# Patient Record
Sex: FEMALE | NOT HISPANIC OR LATINO | URBAN - NONMETROPOLITAN AREA
[De-identification: names, ages, dates, MRNs, and addresses within clinical notes are randomized per-mention and may not be internally consistent; named-entity substitution may affect disease eponyms.]

---

## 2017-04-27 ENCOUNTER — APPOINTMENT (RX ONLY)
Dept: URBAN - NONMETROPOLITAN AREA CLINIC 29 | Facility: CLINIC | Age: 27
Setting detail: DERMATOLOGY
End: 2017-04-27

## 2017-04-27 VITALS — SYSTOLIC BLOOD PRESSURE: 110 MMHG | WEIGHT: 135 LBS | DIASTOLIC BLOOD PRESSURE: 70 MMHG | HEIGHT: 65 IN

## 2017-04-27 DIAGNOSIS — L738 OTHER SPECIFIED DISEASES OF HAIR AND HAIR FOLLICLES: ICD-10-CM

## 2017-04-27 DIAGNOSIS — L73.9 FOLLICULAR DISORDER, UNSPECIFIED: ICD-10-CM

## 2017-04-27 DIAGNOSIS — L57.0 ACTINIC KERATOSIS: ICD-10-CM

## 2017-04-27 DIAGNOSIS — L663 OTHER SPECIFIED DISEASES OF HAIR AND HAIR FOLLICLES: ICD-10-CM

## 2017-04-27 PROBLEM — L02.425 FURUNCLE OF RIGHT LOWER LIMB: Status: ACTIVE | Noted: 2017-04-27

## 2017-04-27 PROCEDURE — ? PATIENT SPECIFIC COUNSELING

## 2017-04-27 PROCEDURE — ? LIQUID NITROGEN

## 2017-04-27 PROCEDURE — 17000 DESTRUCT PREMALG LESION: CPT

## 2017-04-27 PROCEDURE — ? INTRALESIONAL KENALOG

## 2017-04-27 PROCEDURE — 11900 INJECT SKIN LESIONS </W 7: CPT | Mod: 59

## 2017-04-27 ASSESSMENT — LOCATION SIMPLE DESCRIPTION DERM
LOCATION SIMPLE: RIGHT THIGH
LOCATION SIMPLE: LEFT CHEEK
LOCATION SIMPLE: LEFT CHEEK
LOCATION SIMPLE: RIGHT THIGH

## 2017-04-27 ASSESSMENT — LOCATION DETAILED DESCRIPTION DERM
LOCATION DETAILED: RIGHT ANTERIOR MEDIAL PROXIMAL THIGH
LOCATION DETAILED: LEFT INFERIOR MEDIAL MALAR CHEEK
LOCATION DETAILED: RIGHT ANTERIOR MEDIAL PROXIMAL THIGH
LOCATION DETAILED: LEFT INFERIOR MEDIAL MALAR CHEEK

## 2017-04-27 ASSESSMENT — LOCATION ZONE DERM
LOCATION ZONE: LEG
LOCATION ZONE: FACE
LOCATION ZONE: FACE
LOCATION ZONE: LEG

## 2017-04-27 NOTE — PROCEDURE: LIQUID NITROGEN
Duration Of Freeze Thaw-Cycle (Seconds): 1
Post-Care Instructions: I reviewed with the patient in detail post-care instructions. Patient is to wear sunprotection, and avoid picking at any of the treated lesions. Pt may apply Vaseline to crusted or scabbing areas.
Render Post-Care Instructions In Note?: no
Consent: The patient's consent was obtained including but not limited to risks of crusting, scabbing, blistering, scarring, darker or lighter pigmentary change, recurrence, incomplete removal and infection.
Detail Level: Detailed
Number Of Freeze-Thaw Cycles: 1 freeze-thaw cycle

## 2017-04-27 NOTE — PROCEDURE: MIPS QUALITY
Quality 130: Documentation Of Current Medications In The Medical Record: Current Medications Documented
Quality 431: Preventive Care And Screening: Unhealthy Alcohol Use - Screening: Patient screened for unhealthy alcohol use using a single question and scores less than 2 times per year
Detail Level: Detailed
Quality 128: Preventive Care And Screening: Body Mass Index (Bmi) Screening And Follow-Up Plan: BMI is documented within normal parameters and no follow-up plan is required.
Quality 226: Preventive Care And Screening: Tobacco Use: Screening And Cessation Intervention: Patient screened for tobacco and never smoked
Quality 110: Preventive Care And Screening: Influenza Immunization: Influenza Immunization Administered during Influenza season

## 2017-04-27 NOTE — PROCEDURE: PATIENT SPECIFIC COUNSELING
Detail Level: Detailed
Patient is exposed to elements with history of frost bite.  Discussed preventive measures.

## 2017-04-27 NOTE — PROCEDURE: INTRALESIONAL KENALOG
Detail Level: Detailed
Lot # For Kenalog (Optional): [id number]
X Size Of Lesion In Cm (Optional): 0
Consent: The risks of atrophy were reviewed with the patient.
Expiration Date For Kenalog (Optional): Aug 2018
Concentration Of Kenalog Solution Injected (Mg/Ml): 5.0
Kenalog Preparation: Kenalog
Total Volume (Ccs): 0.4
Medical Necessity Clause: This procedure was medically necessary because the lesions that were treated were:
Administered By (Optional): Mitra Diaz MD
Include Z78.9 (Other Specified Conditions Influencing Health Status) As An Associated Diagnosis?: No

## 2017-07-11 ENCOUNTER — APPOINTMENT (RX ONLY)
Dept: URBAN - NONMETROPOLITAN AREA CLINIC 29 | Facility: CLINIC | Age: 27
Setting detail: DERMATOLOGY
End: 2017-07-11

## 2017-07-11 VITALS — WEIGHT: 135 LBS | SYSTOLIC BLOOD PRESSURE: 118 MMHG | HEIGHT: 65 IN | DIASTOLIC BLOOD PRESSURE: 80 MMHG

## 2017-07-11 DIAGNOSIS — L81.4 OTHER MELANIN HYPERPIGMENTATION: ICD-10-CM

## 2017-07-11 PROCEDURE — 99212 OFFICE O/P EST SF 10 MIN: CPT

## 2017-07-11 PROCEDURE — ? COUNSELING

## 2017-07-11 PROCEDURE — ? PRESCRIPTION

## 2017-07-11 PROCEDURE — ? PATIENT SPECIFIC COUNSELING

## 2017-07-11 RX ORDER — HYDROQUINONE 4 %
CREAM (GRAM) TOPICAL
Qty: 1 | Refills: 3 | Status: ACTIVE

## 2017-07-11 RX ORDER — TRETINOIN 0.25 MG/G
CREAM TOPICAL
Qty: 1 | Refills: 3 | Status: ACTIVE

## 2017-07-11 ASSESSMENT — LOCATION SIMPLE DESCRIPTION DERM: LOCATION SIMPLE: LEFT CHEEK

## 2017-07-11 ASSESSMENT — LOCATION DETAILED DESCRIPTION DERM
LOCATION DETAILED: LEFT INFERIOR CENTRAL MALAR CHEEK
LOCATION DETAILED: LEFT INFERIOR MEDIAL MALAR CHEEK
LOCATION DETAILED: LEFT CENTRAL MALAR CHEEK

## 2017-07-11 ASSESSMENT — LOCATION ZONE DERM: LOCATION ZONE: FACE

## 2017-07-11 NOTE — PROCEDURE: PATIENT SPECIFIC COUNSELING
Recommend Hydroquinone 4% and Retinol 0.25 to use on spots on face. Sent to skin desk to  scripts.
Detail Level: Zone

## 2017-07-11 NOTE — PROCEDURE: MIPS QUALITY
Quality 110: Preventive Care And Screening: Influenza Immunization: Influenza Immunization Administered during Influenza season
Detail Level: Detailed
Quality 226: Preventive Care And Screening: Tobacco Use: Screening And Cessation Intervention: Patient screened for tobacco and never smoked
Quality 431: Preventive Care And Screening: Unhealthy Alcohol Use - Screening: Patient screened for unhealthy alcohol use using a single question and scores less than 2 times per year
Quality 130: Documentation Of Current Medications In The Medical Record: Current Medications Documented
Quality 128: Preventive Care And Screening: Body Mass Index (Bmi) Screening And Follow-Up Plan: BMI is documented within normal parameters and no follow-up plan is required.

## 2018-07-19 ENCOUNTER — APPOINTMENT (RX ONLY)
Dept: URBAN - NONMETROPOLITAN AREA CLINIC 29 | Facility: CLINIC | Age: 28
Setting detail: DERMATOLOGY
End: 2018-07-19

## 2018-07-19 VITALS — DIASTOLIC BLOOD PRESSURE: 60 MMHG | HEIGHT: 64 IN | SYSTOLIC BLOOD PRESSURE: 108 MMHG | WEIGHT: 128 LBS

## 2018-07-19 DIAGNOSIS — L81.1 CHLOASMA: ICD-10-CM

## 2018-07-19 DIAGNOSIS — L57.8 OTHER SKIN CHANGES DUE TO CHRONIC EXPOSURE TO NONIONIZING RADIATION: ICD-10-CM

## 2018-07-19 PROCEDURE — 99213 OFFICE O/P EST LOW 20 MIN: CPT

## 2018-07-19 PROCEDURE — ? SUNSCREEN RECOMMENDATIONS

## 2018-07-19 PROCEDURE — ? COUNSELING

## 2018-07-19 PROCEDURE — ? PRESCRIPTION

## 2018-07-19 RX ORDER — HYDROQUINONE 4 %
CREAM (GRAM) TOPICAL
Qty: 1 | Refills: 1 | Status: ERX | COMMUNITY
Start: 2018-07-19

## 2018-07-19 RX ADMIN — Medication: at 17:38

## 2018-07-19 ASSESSMENT — LOCATION SIMPLE DESCRIPTION DERM: LOCATION SIMPLE: LEFT LIP

## 2018-07-19 ASSESSMENT — LOCATION ZONE DERM: LOCATION ZONE: LIP

## 2018-07-19 ASSESSMENT — LOCATION DETAILED DESCRIPTION DERM: LOCATION DETAILED: LEFT UPPER CUTANEOUS LIP

## 2018-07-19 NOTE — PROCEDURE: COUNSELING
Patient Specific Counseling (Will Not Stick From Patient To Patient): Wear a wide brim hat and sunscreen
Detail Level: Generalized
Detail Level: Simple

## 2018-07-19 NOTE — PROCEDURE: MIPS QUALITY
Quality 431: Preventive Care And Screening: Unhealthy Alcohol Use - Screening: Patient screened for unhealthy alcohol use using a single question and scores less than 2 times per year
Detail Level: Detailed
Quality 110: Preventive Care And Screening: Influenza Immunization: Influenza Immunization Administered during Influenza season
Quality 226: Preventive Care And Screening: Tobacco Use: Screening And Cessation Intervention: Patient screened for tobacco and never smoked
Quality 128: Preventive Care And Screening: Body Mass Index (Bmi) Screening And Follow-Up Plan: BMI is documented within normal parameters and no follow-up plan is required.
Quality 130: Documentation Of Current Medications In The Medical Record: Current Medications Documented

## 2018-07-19 NOTE — PROCEDURE: SUNSCREEN RECOMMENDATIONS
Detail Level: Generalized
Products Recommended: Hats and Sunscreen and reapply sunscreen. [place]
General Sunscreen Counseling: I recommended a broad spectrum sunscreen with a SPF of 50 or higher.  I explained that SPF 50 sunscreens block approximately 97 percent of the sun's harmful rays.  Sunscreens should be applied at least 15 minutes prior to expected sun exposure and then every 2 hours after that as long as sun exposure continues. If swimming or exercising sunscreen should be reapplied every 45 minutes to an hour after getting wet or sweating.  One ounce, or the equivalent of a shot glass full of sunscreen, is adequate to protect the skin not covered by a bathing suit. I also recommended a lip balm with a sunscreen as well. Sun protective clothing can be used in lieu of sunscreen but must be worn the entire time you are exposed to the sun's rays.

## 2022-03-31 ENCOUNTER — APPOINTMENT (RX ONLY)
Dept: URBAN - NONMETROPOLITAN AREA CLINIC 30 | Facility: CLINIC | Age: 32
Setting detail: DERMATOLOGY
End: 2022-03-31

## 2022-03-31 DIAGNOSIS — Z41.9 ENCOUNTER FOR PROCEDURE FOR PURPOSES OTHER THAN REMEDYING HEALTH STATE, UNSPECIFIED: ICD-10-CM

## 2022-03-31 PROCEDURE — ? PERFECT PEEL

## 2022-03-31 ASSESSMENT — LOCATION DETAILED DESCRIPTION DERM: LOCATION DETAILED: RIGHT MEDIAL FOREHEAD

## 2022-03-31 ASSESSMENT — LOCATION SIMPLE DESCRIPTION DERM: LOCATION SIMPLE: RIGHT FOREHEAD

## 2022-03-31 ASSESSMENT — LOCATION ZONE DERM: LOCATION ZONE: FACE

## 2022-03-31 NOTE — PROCEDURE: PERFECT PEEL
Chemical Peel: Perfect Peel
Post Peel Care: After the Pefect Peel was applied, detailed post-care instructions were reviewed. The patient was instructed to leave the peel on until the end of day if possible or 6 hours minimum and to apply the Retin A pads and moisturizers (both provide) as directed the next day.
Post-Care Instructions: I reviewed with the patient in detail post-care instructions. Patient should avoid sun exposure and wear sun protection.
Prep: The treated area was degreased with pre-peel cleanser, and Aquaphor was applied for protection of mucous membranes.
Treatment Number: 0
Consent: Prior to the procedure, written consent was obtained and risks were reviewed, including but not limited to: redness, peeling, blistering, pigmentary change, scarring, infection, and pain.
Detail Level: Zone
show

## 2022-04-26 ENCOUNTER — APPOINTMENT (RX ONLY)
Dept: URBAN - NONMETROPOLITAN AREA CLINIC 29 | Facility: CLINIC | Age: 32
Setting detail: DERMATOLOGY
End: 2022-04-26

## 2022-04-26 DIAGNOSIS — Z41.9 ENCOUNTER FOR PROCEDURE FOR PURPOSES OTHER THAN REMEDYING HEALTH STATE, UNSPECIFIED: ICD-10-CM

## 2022-04-26 PROCEDURE — ? PERFECT PEEL

## 2022-04-26 ASSESSMENT — LOCATION ZONE DERM: LOCATION ZONE: FACE

## 2022-04-26 ASSESSMENT — LOCATION DETAILED DESCRIPTION DERM: LOCATION DETAILED: INFERIOR MID FOREHEAD

## 2022-04-26 ASSESSMENT — LOCATION SIMPLE DESCRIPTION DERM: LOCATION SIMPLE: INFERIOR FOREHEAD

## 2022-04-26 NOTE — PROCEDURE: PERFECT PEEL
Post Peel Care: After the Pefect Peel was applied, detailed post-care instructions were reviewed. The patient was instructed to leave the peel on until the end of day if possible or 6 hours minimum and to apply the Retin A pads and moisturizers (both provide) as directed the next day.
Chemical Peel: Perfect Peel
Consent: Prior to the procedure, written consent was obtained and risks were reviewed, including but not limited to: redness, peeling, blistering, pigmentary change, scarring, infection, and pain.
Post-Care Instructions: I reviewed with the patient in detail post-care instructions. Patient should avoid sun exposure and wear sun protection.
Prep: The treated area was degreased with pre-peel cleanser, and moisturizer was applied for protection of mucous membranes.
Detail Level: Zone
Treatment Number: 2

## 2022-05-31 ENCOUNTER — APPOINTMENT (RX ONLY)
Dept: URBAN - NONMETROPOLITAN AREA CLINIC 29 | Facility: CLINIC | Age: 32
Setting detail: DERMATOLOGY
End: 2022-05-31

## 2022-05-31 DIAGNOSIS — Z41.9 ENCOUNTER FOR PROCEDURE FOR PURPOSES OTHER THAN REMEDYING HEALTH STATE, UNSPECIFIED: ICD-10-CM

## 2022-05-31 PROCEDURE — ? PERFECT PEEL

## 2022-05-31 ASSESSMENT — LOCATION DETAILED DESCRIPTION DERM: LOCATION DETAILED: RIGHT MEDIAL FOREHEAD

## 2022-05-31 ASSESSMENT — LOCATION SIMPLE DESCRIPTION DERM: LOCATION SIMPLE: RIGHT FOREHEAD

## 2022-05-31 ASSESSMENT — LOCATION ZONE DERM: LOCATION ZONE: FACE

## 2022-05-31 NOTE — PROCEDURE: PERFECT PEEL
Consent: Prior to the procedure, written consent was obtained and risks were reviewed, including but not limited to: redness, peeling, blistering, pigmentary change, scarring, infection, and pain.
Prep: The treated area was degreased with pre-peel cleanser, and moisturizer was applied for protection of mucous membranes.
Detail Level: Zone
Post Peel Care: After the Pefect Peel was applied, detailed post-care instructions were reviewed. The patient was instructed to leave the peel on until the end of day if possible or 6 hours minimum and to apply the Retin A pads and moisturizers (both provide) as directed the next day.
Chemical Peel: Perfect Peel
Treatment Number: 3
Post-Care Instructions: I reviewed with the patient in detail post-care instructions. Patient should avoid sun exposure and wear sun protection.

## 2022-06-09 ENCOUNTER — APPOINTMENT (RX ONLY)
Dept: URBAN - NONMETROPOLITAN AREA CLINIC 29 | Facility: CLINIC | Age: 32
Setting detail: DERMATOLOGY
End: 2022-06-09

## 2022-06-09 DIAGNOSIS — Z41.9 ENCOUNTER FOR PROCEDURE FOR PURPOSES OTHER THAN REMEDYING HEALTH STATE, UNSPECIFIED: ICD-10-CM

## 2022-06-09 PROCEDURE — ? DIAMONDGLOW

## 2022-06-09 PROCEDURE — ? LASER HAIR REMOVAL

## 2022-06-09 ASSESSMENT — LOCATION DETAILED DESCRIPTION DERM
LOCATION DETAILED: RIGHT MEDIAL FOREHEAD
LOCATION DETAILED: MONS PUBIS

## 2022-06-09 ASSESSMENT — LOCATION ZONE DERM
LOCATION ZONE: FACE
LOCATION ZONE: VULVA

## 2022-06-09 ASSESSMENT — LOCATION SIMPLE DESCRIPTION DERM
LOCATION SIMPLE: RIGHT FOREHEAD
LOCATION SIMPLE: GROIN

## 2022-06-09 NOTE — PROCEDURE: LASER HAIR REMOVAL
Number Of Prepaid Treatments (Will Not Render If 0): 0
Tolerated Procedure (Optional): Tolerated Well
Render Post-Care In The Note: No
Fluence (Will Not Render If 0): 20
Pre-Procedure: Prior to proceeding the treatment areas were cleaned and all present put on their eye protection.
Laser Type: Desire Diode 805nm
Post-Care Instructions: I reviewed with the patient in detail post-care instructions. Patient should avoid sun for a minimum of 4 weeks before and after treatment.
Shaving (Optional): The patient shaved at home
Detail Level: Detailed
Consent: Written consent obtained, risks reviewed including but not limited to crusting, scabbing, blistering, scarring, darker or lighter pigmentary change, paradoxical hair regrowth, incomplete removal of hair and infection.
Post-Procedure Care: Immediate endpoint: perifollicular erythema and edema. Post care reviewed with patient.

## 2022-06-09 NOTE — PROCEDURE: DIAMONDGLOW
Detail Level: Zone
Intelliflow Setting: 100%
Body Treatment Head Used?: Not Used
Solution Used: Brightening Serum
Post-Care Instructions: I reviewed with the patient in detail post-care instructions. Patient should stay away from the sun and wear sun protection until treated areas are fully healed.
Facial Treatment Head Used?: Facial: Fine (120 grit) 9 mm
Number Of Passes: 2
Price (Use Numbers Only, No Special Characters Or $): 229
Vacuum Pressure In Inches: 3
Endpoint: mild erythema
Consent: Written consent obtained, risks reviewed including but not limited to crusting, scabbing, blistering, scarring, darker or lighter pigmentary change, bruising, and/or incomplete response.

## 2022-07-06 ENCOUNTER — APPOINTMENT (RX ONLY)
Dept: URBAN - NONMETROPOLITAN AREA CLINIC 29 | Facility: CLINIC | Age: 32
Setting detail: DERMATOLOGY
End: 2022-07-06

## 2022-07-06 DIAGNOSIS — Z41.9 ENCOUNTER FOR PROCEDURE FOR PURPOSES OTHER THAN REMEDYING HEALTH STATE, UNSPECIFIED: ICD-10-CM

## 2022-07-06 PROCEDURE — ? LASER HAIR REMOVAL

## 2022-07-06 PROCEDURE — ? SKINPEN

## 2022-07-06 ASSESSMENT — LOCATION ZONE DERM
LOCATION ZONE: FACE
LOCATION ZONE: VULVA

## 2022-07-06 ASSESSMENT — LOCATION DETAILED DESCRIPTION DERM
LOCATION DETAILED: INFERIOR MID FOREHEAD
LOCATION DETAILED: MONS PUBIS

## 2022-07-06 ASSESSMENT — LOCATION SIMPLE DESCRIPTION DERM
LOCATION SIMPLE: GROIN
LOCATION SIMPLE: INFERIOR FOREHEAD

## 2022-07-06 NOTE — PROCEDURE: LASER HAIR REMOVAL
Treatment Number: 0
Shaving (Optional): The patient shaved at home
Fluence (Will Not Render If 0): 20
Were Eye Shields Employed?: No
Post-Care Instructions: I reviewed with the patient in detail post-care instructions. Patient should avoid sun for a minimum of 4 weeks before and after treatment.
Consent: Written consent obtained, risks reviewed including but not limited to crusting, scabbing, blistering, scarring, darker or lighter pigmentary change, paradoxical hair regrowth, incomplete removal of hair and infection.
Laser Type: Desire Diode 805nm
Tolerated Procedure (Optional): Tolerated Well
Detail Level: Detailed
Post-Procedure Care: Immediate endpoint: perifollicular erythema and edema. Post care reviewed with patient.
Pre-Procedure: Prior to proceeding the treatment areas were cleaned and all present put on their eye protection.

## 2022-07-06 NOTE — PROCEDURE: SKINPEN
Location #2: Cheeks
Location #3: Upper lip and Nose
Location #1: Forehead
Post-Care Instructions: After the procedure, take precautions agains sun exposure. Do not apply sunscreen for 12 hours after the procedure. Do not apply make-up for 12 hours after the procedure. Avoid alcohol based toners for 10-14 days. After 2-3 days patients can return to their regular skin regimen.
Location #4: Chin/ Jawline
Consent: Written consent obtained, risks reviewed including but not limited to pain, scarring, infection and incomplete improvement.  Patient understands the procedure is cosmetic in nature and will require out of pocket payment.
Depth In Mm: 1
Depth In Mm: 1.25
Topical Anesthesia?: 23% lidocaine, 7% tetracaine
Depth In Mm: 0.5
Price (Use Numbers Only, No Special Characters Or $): 0
Detail Level: Zone

## 2022-08-03 ENCOUNTER — APPOINTMENT (RX ONLY)
Dept: URBAN - NONMETROPOLITAN AREA CLINIC 29 | Facility: CLINIC | Age: 32
Setting detail: DERMATOLOGY
End: 2022-08-03

## 2022-08-03 DIAGNOSIS — Z41.9 ENCOUNTER FOR PROCEDURE FOR PURPOSES OTHER THAN REMEDYING HEALTH STATE, UNSPECIFIED: ICD-10-CM

## 2022-08-03 PROCEDURE — ? INVENTORY

## 2022-08-03 PROCEDURE — ? LASER HAIR REMOVAL

## 2022-08-03 PROCEDURE — ? RECOMMENDATIONS

## 2022-08-03 PROCEDURE — ? DIAMONDGLOW

## 2022-08-03 ASSESSMENT — LOCATION SIMPLE DESCRIPTION DERM
LOCATION SIMPLE: LEFT FOREHEAD
LOCATION SIMPLE: GENITALIA

## 2022-08-03 ASSESSMENT — LOCATION ZONE DERM
LOCATION ZONE: TRUNK
LOCATION ZONE: FACE

## 2022-08-03 ASSESSMENT — LOCATION DETAILED DESCRIPTION DERM
LOCATION DETAILED: GENITALIA
LOCATION DETAILED: LEFT SUPERIOR FOREHEAD

## 2022-08-03 NOTE — PROCEDURE: DIAMONDGLOW
Facial Treatment Head Used?: Facial: Medium-Fine (100 grit) 6 mm
Post-Care Instructions: I reviewed with the patient in detail post-care instructions. Patient should stay away from the sun and wear sun protection until treated areas are fully healed.
Intelliflow Setting: 100%
Vacuum Pressure In Inches: 3.5
Body Treatment Head Used?: Not Used
Solution Used: Lumixyl Pro-Infusion
Additional Solution Used: SkinMedica HA5 Hydrator
Detail Level: Zone
Consent: Written consent obtained, risks reviewed including but not limited to crusting, scabbing, blistering, scarring, darker or lighter pigmentary change, bruising, and/or incomplete response.
Number Of Passes: 2

## 2022-08-03 NOTE — PROCEDURE: RECOMMENDATIONS
Recommendation Preamble: The following treatment recommendations were given: Fraxel or C&B (preferably Fraxel) and Obagi C Clarify
Render Risk Assessment In Note?: no
Detail Level: Zone

## 2022-08-03 NOTE — PROCEDURE: LASER HAIR REMOVAL
Pulse Duration (Include Units): 30 ms
Laser Type: Desire Diode 805nm
Consent: Written consent obtained, risks reviewed including but not limited to crusting, scabbing, blistering, scarring, darker or lighter pigmentary change, paradoxical hair regrowth, incomplete removal of hair and infection.
Number Of Prepaid Treatments (Will Not Render If 0): 0
Post-Care Instructions: I reviewed with the patient in detail post-care instructions. Patient should avoid sun for a minimum of 4 weeks before and after treatment.
Were Eye Shields Employed?: No
Detail Level: Zone
Anesthesia Type: 1% lidocaine with epinephrine
Tolerated Procedure (Optional): Tolerated Well
Pre-Procedure: Prior to proceeding the treatment areas were cleaned.
Cooling: chill tip
Fluence (Will Not Render If 0): 20
Post-Procedure Care: Immediate endpoint: perifollicular erythema and edema. Post care reviewed with patient.
Shaving (Optional): The patient shaved at home
Fluence (Will Not Render If 0): 27
Eye Shield Text: Given the treatment area eye shields were inserted prior to treatment.

## 2022-09-15 ENCOUNTER — APPOINTMENT (RX ONLY)
Dept: URBAN - NONMETROPOLITAN AREA CLINIC 29 | Facility: CLINIC | Age: 32
Setting detail: DERMATOLOGY
End: 2022-09-15

## 2022-09-15 DIAGNOSIS — Z41.9 ENCOUNTER FOR PROCEDURE FOR PURPOSES OTHER THAN REMEDYING HEALTH STATE, UNSPECIFIED: ICD-10-CM

## 2022-09-15 PROCEDURE — ? INVENTORY

## 2022-09-15 PROCEDURE — ? LASER HAIR REMOVAL

## 2022-09-15 NOTE — PROCEDURE: LASER HAIR REMOVAL
Treatment Number: 0
Fluence (Will Not Render If 0): 20
Anesthesia Type: 1% lidocaine with epinephrine
Detail Level: Zone
Render Post-Care In The Note: No
Tolerated Procedure (Optional): Tolerated Well
Cooling: chill tip
Fluence (Will Not Render If 0): 8
Fluence (Will Not Render If 0): 10
Post-Procedure Care: Immediate endpoint: perifollicular erythema and edema. Post care reviewed with patient.
Laser Type: Desire Diode 805nm
Shaving (Optional): The patient shaved at home
Eye Shield Text: Given the treatment area eye shields were inserted prior to treatment.
Pulse Duration (Include Units): 30 ms
Post-Care Instructions: I reviewed with the patient in detail post-care instructions. Patient should avoid sun for a minimum of 4 weeks before and after treatment.
Consent: Written consent obtained, risks reviewed including but not limited to crusting, scabbing, blistering, scarring, darker or lighter pigmentary change, paradoxical hair regrowth, incomplete removal of hair and infection.
Topical Anesthesia Type: BLT ointment (benzocaine 20%, lidocaine 10%, tetracaine 10%)
Length Of Topical Anesthesia Application (Optional): 20 minutes
Pre-Procedure: Prior to proceeding the treatment areas were cleaned.

## 2022-10-27 ENCOUNTER — APPOINTMENT (RX ONLY)
Dept: URBAN - NONMETROPOLITAN AREA CLINIC 30 | Facility: CLINIC | Age: 32
Setting detail: DERMATOLOGY
End: 2022-10-27

## 2022-11-02 ENCOUNTER — APPOINTMENT (RX ONLY)
Dept: URBAN - NONMETROPOLITAN AREA CLINIC 29 | Facility: CLINIC | Age: 32
Setting detail: DERMATOLOGY
End: 2022-11-02

## 2022-11-02 DIAGNOSIS — Z41.9 ENCOUNTER FOR PROCEDURE FOR PURPOSES OTHER THAN REMEDYING HEALTH STATE, UNSPECIFIED: ICD-10-CM

## 2022-11-02 PROCEDURE — ? INVENTORY

## 2022-11-02 PROCEDURE — ? LASER HAIR REMOVAL

## 2022-11-02 ASSESSMENT — LOCATION SIMPLE DESCRIPTION DERM: LOCATION SIMPLE: GROIN

## 2022-11-02 ASSESSMENT — LOCATION DETAILED DESCRIPTION DERM: LOCATION DETAILED: MONS PUBIS

## 2022-11-02 ASSESSMENT — LOCATION ZONE DERM: LOCATION ZONE: VULVA

## 2022-11-02 NOTE — PROCEDURE: LASER HAIR REMOVAL
Fluence (Will Not Render If 0): 20
Number Of Prepaid Treatments (Will Not Render If 0): 0
Detail Level: Zone
Fluence (Will Not Render If 0): 10
Tolerated Procedure (Optional): Tolerated Well
Cooling Override: n/a
Were Eye Shields Employed?: No
Pulse Duration (Include Units): 30 ms
Treatment Number: 5
Anesthesia Type: 1% lidocaine with epinephrine
Pre-Procedure: Prior to proceeding the treatment areas were cleaned and treatment area was agreed upon by the patient.
Shaving (Optional): The patient shaved at home
Consent: Written consent obtained, risks reviewed including but not limited to crusting, scabbing, blistering, scarring, darker or lighter pigmentary change, paradoxical hair regrowth, incomplete removal of hair and infection.
Eye Shield Text: Given the treatment area eye shields were inserted prior to treatment.
Post-Procedure Care: Immediate endpoint: perifollicular erythema and edema. Post care reviewed with patient.
Post-Care Instructions: I reviewed with the patient in detail post-care instructions. Patient should avoid sun for a minimum of 4 weeks before and after treatment.
Cooling: chill tip
Laser Type: Desire Diode 805nm
Fluence (Will Not Render If 0): 8

## 2023-01-04 ENCOUNTER — APPOINTMENT (RX ONLY)
Dept: URBAN - NONMETROPOLITAN AREA CLINIC 29 | Facility: CLINIC | Age: 33
Setting detail: DERMATOLOGY
End: 2023-01-04

## 2023-01-04 DIAGNOSIS — Z41.9 ENCOUNTER FOR PROCEDURE FOR PURPOSES OTHER THAN REMEDYING HEALTH STATE, UNSPECIFIED: ICD-10-CM

## 2023-01-04 PROCEDURE — ? INVENTORY

## 2023-01-04 PROCEDURE — ? LASER HAIR REMOVAL

## 2023-01-04 ASSESSMENT — LOCATION DETAILED DESCRIPTION DERM: LOCATION DETAILED: MONS PUBIS

## 2023-01-04 ASSESSMENT — LOCATION SIMPLE DESCRIPTION DERM: LOCATION SIMPLE: GROIN

## 2023-01-04 ASSESSMENT — LOCATION ZONE DERM: LOCATION ZONE: VULVA

## 2023-01-04 NOTE — PROCEDURE: LASER HAIR REMOVAL
Number Of Prepaid Treatments (Will Not Render If 0): 0
Detail Level: Zone
Fluence (Will Not Render If 0): 8
Cooling: chill tip
Render Post-Care In The Note: No
Pre-Procedure: Prior to proceeding the treatment areas were cleaned and treatment area was agreed upon by the patient.
Laser Type: Desire Diode 805nm
Pulse Duration (Include Units): 30 ms
Cooling Override: n/a
Fluence (Will Not Render If 0): 24
Shaving (Optional): The patient shaved at home
Anesthesia Type: 1% lidocaine with epinephrine
Fluence (Will Not Render If 0): 20
Eye Shield Text: Given the treatment area eye shields were inserted prior to treatment.
Post-Procedure Care: Immediate endpoint: perifollicular erythema and edema. Post care reviewed with patient.
Consent: Written consent obtained, risks reviewed including but not limited to crusting, scabbing, blistering, scarring, darker or lighter pigmentary change, paradoxical hair regrowth, incomplete removal of hair and infection.
Pulse Duration (Include Units): 100 ms
Tolerated Procedure (Optional): Tolerated Well
Location Override: labia
Post-Care Instructions: I reviewed with the patient in detail post-care instructions. Patient should avoid sun for a minimum of 4 weeks before and after treatment.

## 2024-03-29 ENCOUNTER — APPOINTMENT (RX ONLY)
Dept: URBAN - NONMETROPOLITAN AREA CLINIC 30 | Facility: CLINIC | Age: 34
Setting detail: DERMATOLOGY
End: 2024-03-29

## 2024-03-29 DIAGNOSIS — Z41.9 ENCOUNTER FOR PROCEDURE FOR PURPOSES OTHER THAN REMEDYING HEALTH STATE, UNSPECIFIED: ICD-10-CM

## 2024-03-29 PROCEDURE — ? LASER HAIR REMOVAL

## 2024-03-29 PROCEDURE — ? FRAXEL

## 2024-03-29 PROCEDURE — ? INVENTORY

## 2024-03-29 ASSESSMENT — LOCATION ZONE DERM
LOCATION ZONE: LEG
LOCATION ZONE: FACE

## 2024-03-29 ASSESSMENT — LOCATION DETAILED DESCRIPTION DERM
LOCATION DETAILED: LEFT INFERIOR CENTRAL MALAR CHEEK
LOCATION DETAILED: LEFT ANTERIOR PROXIMAL THIGH

## 2024-03-29 ASSESSMENT — LOCATION SIMPLE DESCRIPTION DERM
LOCATION SIMPLE: LEFT THIGH
LOCATION SIMPLE: LEFT CHEEK

## 2024-03-29 NOTE — PROCEDURE: LASER HAIR REMOVAL
Number Of Prepaid Treatments (Will Not Render If 0): 0
Post-Procedure Care: Immediate endpoint: perifollicular erythema and edema. Post care reviewed with patient.
Render Post-Care In The Note: Yes
Were Eye Shields Employed?: No
Cooling: chill tip
Detail Level: Zone
Tolerated Procedure (Optional): Tolerated Well
Laser Type: Desire Diode 805nm
Fluence (Will Not Render If 0): 8
Fluence (Will Not Render If 0): 31
Fluence (Will Not Render If 0): 22
Fluence (Will Not Render If 0): 20
Shaving (Optional): The patient shaved at home
Eye Shield Text: Given the treatment area eye shields were inserted prior to treatment.
Pulse Duration (Include Units): 30 ms
Cooling Override: n/a
Pulse Duration (Include Units): 100ms
Pulse Duration (Include Units): 100 ms
Consent: Verbal consent obtained, risks reviewed including but not limited to crusting, scabbing, blistering, scarring, darker or lighter pigmentary change, paradoxical hair regrowth, incomplete removal of hair and infection.
Anesthesia Type: 1% lidocaine with epinephrine
Pre-Procedure: Prior to proceeding the treatment areas were cleaned and treatment area was agreed upon by the patient.
Post-Care Instructions: I reviewed with the patient in detail post-care instructions. Patient should avoid sun for a minimum of 4 weeks before and after treatment.

## 2024-03-29 NOTE — PROCEDURE: FRAXEL
Number Of Passes: 8
Topical Anesthesia Type: BLT ointment (benzocaine 20%, lidocaine 10%, tetracaine 10%)
Small Plastic Eye Shield Text: The ocular mucosa was anesthetized with tetracaine. Once adequate anesthesia was optained, small plastic eye shields were inserted and remained in place until the procedure was completed.
Tip: 15mm
Location: decolletage of the chest
Energy(Mj/Cm2): 1
Treatment Level: 5
Location: full face except eyelids
Consent: Written consent obtained, risks reviewed including but not limited to pain and incomplete improvement.
External Cooling: Devi Cryo 6
Location: Use Location Override
Number Of Passes: 4
Energy(Mj/Cm2): 20
Medium Plastic Eye Shield Text: The ocular mucosa was anesthetized with tetracaine. Once adequate anesthesia was optained, medium plastic eye shields were inserted and remained in place until the procedure was completed.
Add Post-Care Below To The Note: Yes
Energy(Mj/Cm2): 10
Large Plastic Eye Shield Text: The ocular mucosa was anesthetized with tetracaine. Once adequate anesthesia was optained, large plastic eye shields were inserted and remained in place until the procedure was completed.
External Cooling Fan Speed: 7
Wavelength: 1550nm
Detail Level: Zone
Was An Eye Shield Used?: No
Medium Metal Eye Shield Text: The ocular mucosa was anesthetized with tetracaine. Once adequate anesthesia was optained, medium metal eye shields were inserted and remained in place until the procedure was completed.
Energy(Mj/Cm2): 15
Post-Care Instructions: I reviewed with the patient in detail post-care instructions. Patient should avoid sun until area fully healed.
Location: upper and lower lids
Indication: resurfacing
Large Metal Eye Shield Text: The ocular mucosa was anesthetized with tetracaine. Once adequate anesthesia was optained, large metal eye shields were inserted and remained in place until the procedure was completed.
Treatment Level: 3
Anesthesia Type: 1% lidocaine with epinephrine
Small Metal Eye Shield Text: The ocular mucosa was anesthetized with tetracaine. Once adequate anesthesia was optained, small metal eye shields were inserted and remained in place until the procedure was completed.

## 2024-04-25 ENCOUNTER — APPOINTMENT (RX ONLY)
Dept: URBAN - NONMETROPOLITAN AREA CLINIC 30 | Facility: CLINIC | Age: 34
Setting detail: DERMATOLOGY
End: 2024-04-25

## 2024-04-25 ENCOUNTER — RX ONLY (OUTPATIENT)
Age: 34
Setting detail: RX ONLY
End: 2024-04-25

## 2024-04-25 DIAGNOSIS — Z41.9 ENCOUNTER FOR PROCEDURE FOR PURPOSES OTHER THAN REMEDYING HEALTH STATE, UNSPECIFIED: ICD-10-CM

## 2024-04-25 PROCEDURE — ? INVENTORY

## 2024-04-25 PROCEDURE — ? LASER HAIR REMOVAL

## 2024-04-25 ASSESSMENT — LOCATION DETAILED DESCRIPTION DERM: LOCATION DETAILED: MONS PUBIS

## 2024-04-25 ASSESSMENT — LOCATION ZONE DERM: LOCATION ZONE: VULVA

## 2024-04-25 ASSESSMENT — LOCATION SIMPLE DESCRIPTION DERM: LOCATION SIMPLE: GROIN

## 2024-04-25 NOTE — PROCEDURE: LASER HAIR REMOVAL
Pulse Duration (Include Units): 100 ms
Consent: Verbal consent obtained, risks reviewed including but not limited to crusting, scabbing, blistering, scarring, darker or lighter pigmentary change, paradoxical hair regrowth, incomplete removal of hair and infection.
Pulse Duration (Include Units): 100ms
Pulse Duration (Include Units): 30ms
Detail Level: Zone
Treatment Number: 0
Pre-Procedure: Prior to proceeding the treatment areas were cleaned and treatment area was agreed upon by the patient.
Post-Care Instructions: I reviewed with the patient in detail post-care instructions. Patient should avoid sun for a minimum of 4 weeks before and after treatment.
Shaving (Optional): The patient shaved at home
Eye Shield Text: Given the treatment area eye shields were inserted prior to treatment.
Anesthesia Type: 1% lidocaine with epinephrine
Post-Procedure Care: Immediate endpoint: perifollicular erythema and edema. Post care reviewed with patient.
Cooling: chill tip
Render Post-Care In The Note: Yes
Fluence (Will Not Render If 0): 8
Laser Type: Desire Diode 805nm
Fluence (Will Not Render If 0): 31
Fluence (Will Not Render If 0): 20
Tolerated Procedure (Optional): Tolerated Well
Fluence (Will Not Render If 0): 22
Were Eye Shields Employed?: No
Cooling Override: n/a

## 2024-05-29 ENCOUNTER — APPOINTMENT (RX ONLY)
Dept: URBAN - NONMETROPOLITAN AREA CLINIC 30 | Facility: CLINIC | Age: 34
Setting detail: DERMATOLOGY
End: 2024-05-29

## 2024-05-29 DIAGNOSIS — Z41.9 ENCOUNTER FOR PROCEDURE FOR PURPOSES OTHER THAN REMEDYING HEALTH STATE, UNSPECIFIED: ICD-10-CM

## 2024-05-29 PROCEDURE — ? INVENTORY

## 2024-05-29 PROCEDURE — ? LASER HAIR REMOVAL

## 2024-05-29 ASSESSMENT — LOCATION DETAILED DESCRIPTION DERM: LOCATION DETAILED: MONS PUBIS

## 2024-05-29 ASSESSMENT — LOCATION SIMPLE DESCRIPTION DERM: LOCATION SIMPLE: GROIN

## 2024-05-29 ASSESSMENT — LOCATION ZONE DERM: LOCATION ZONE: VULVA

## 2024-05-29 NOTE — PROCEDURE: LASER HAIR REMOVAL
Number Of Prepaid Treatments (Will Not Render If 0): 0
Cooling Override: n/a
Pulse Duration (Include Units): 34 ms
Pulse Duration (Include Units): 100 ms
Consent: Verbal consent obtained, risks reviewed including but not limited to crusting, scabbing, blistering, scarring, darker or lighter pigmentary change, paradoxical hair regrowth, incomplete removal of hair and infection.
Pulse Duration (Include Units): 100ms
Shaving (Optional): The patient shaved at home
Eye Shield Text: Given the treatment area eye shields were inserted prior to treatment.
Total Pulses: 2
Pre-Procedure: Prior to proceeding the treatment areas were cleaned and treatment area was agreed upon by the patient.
Anesthesia Type: 1% lidocaine with epinephrine
Post-Care Instructions: I reviewed with the patient in detail post-care instructions. Patient should avoid sun for a minimum of 4 weeks before and after treatment.
Post-Procedure Care: Immediate endpoint: perifollicular erythema and edema. Post care reviewed with patient.
Cooling: chill tip
Render Post-Care In The Note: Yes
Laser Type: Desire Diode 805nm
Fluence (Will Not Render If 0): 22
Were Eye Shields Employed?: No
Fluence (Will Not Render If 0): 9
Tolerated Procedure (Optional): Tolerated Well
Fluence (Will Not Render If 0): 31
Fluence (Will Not Render If 0): 20
Detail Level: Zone

## 2024-06-27 ENCOUNTER — APPOINTMENT (RX ONLY)
Dept: URBAN - NONMETROPOLITAN AREA CLINIC 30 | Facility: CLINIC | Age: 34
Setting detail: DERMATOLOGY
End: 2024-06-27

## 2024-06-27 DIAGNOSIS — Z41.9 ENCOUNTER FOR PROCEDURE FOR PURPOSES OTHER THAN REMEDYING HEALTH STATE, UNSPECIFIED: ICD-10-CM

## 2024-06-27 PROCEDURE — ? LASER HAIR REMOVAL

## 2024-06-27 PROCEDURE — ? INVENTORY

## 2024-06-27 ASSESSMENT — LOCATION ZONE DERM: LOCATION ZONE: TRUNK

## 2024-06-27 ASSESSMENT — LOCATION SIMPLE DESCRIPTION DERM: LOCATION SIMPLE: GROIN

## 2024-06-27 ASSESSMENT — LOCATION DETAILED DESCRIPTION DERM: LOCATION DETAILED: LEFT SUPRAPUBIC SKIN

## 2024-06-27 NOTE — PROCEDURE: LASER HAIR REMOVAL
Anesthesia Type: 1% lidocaine with epinephrine
Were Eye Shields Employed?: No
Treatment Number: 0
Post-Care Instructions: I reviewed with the patient in detail post-care instructions. Patient should avoid sun for a minimum of 4 weeks before and after treatment.
Pre-Procedure: Prior to proceeding the treatment areas were cleaned and treatment area was agreed upon by the patient.
Tolerated Procedure (Optional): Tolerated Well
Pulse Duration (Include Units): 100 ms
Cooling: chill tip
Pulse Duration (Include Units): 100ms
Consent: Verbal consent obtained, risks reviewed including but not limited to crusting, scabbing, blistering, scarring, darker or lighter pigmentary change, paradoxical hair regrowth, incomplete removal of hair and infection.
Detail Level: Zone
Total Pulses: 2
Laser Type: Desire Diode 805nm
Fluence (Will Not Render If 0): 20
Eye Shield Text: Given the treatment area eye shields were inserted prior to treatment.
Shaving (Optional): The patient shaved at home
Post-Procedure Care: Immediate endpoint: perifollicular erythema and edema. Post care reviewed with patient.
Cooling Override: n/a
Pulse Duration (Include Units): 30 ms
Render Post-Care In The Note: Yes
Fluence (Will Not Render If 0): 31
Fluence (Will Not Render If 0): 9
Fluence (Will Not Render If 0): 22